# Patient Record
Sex: FEMALE | Race: BLACK OR AFRICAN AMERICAN | NOT HISPANIC OR LATINO | URBAN - METROPOLITAN AREA
[De-identification: names, ages, dates, MRNs, and addresses within clinical notes are randomized per-mention and may not be internally consistent; named-entity substitution may affect disease eponyms.]

---

## 2018-08-23 ENCOUNTER — EMERGENCY (EMERGENCY)
Facility: HOSPITAL | Age: 73
LOS: 1 days | Discharge: HOME | End: 2018-08-23
Attending: EMERGENCY MEDICINE | Admitting: EMERGENCY MEDICINE
Payer: COMMERCIAL

## 2018-08-23 VITALS
TEMPERATURE: 98 F | DIASTOLIC BLOOD PRESSURE: 63 MMHG | HEART RATE: 88 BPM | SYSTOLIC BLOOD PRESSURE: 128 MMHG | RESPIRATION RATE: 18 BRPM | OXYGEN SATURATION: 97 %

## 2018-08-23 DIAGNOSIS — M79.89 OTHER SPECIFIED SOFT TISSUE DISORDERS: ICD-10-CM

## 2018-08-23 DIAGNOSIS — M79.642 PAIN IN LEFT HAND: ICD-10-CM

## 2018-08-23 PROCEDURE — 73110 X-RAY EXAM OF WRIST: CPT | Mod: 26

## 2018-08-23 PROCEDURE — 73090 X-RAY EXAM OF FOREARM: CPT | Mod: 26

## 2018-08-23 PROCEDURE — 73130 X-RAY EXAM OF HAND: CPT | Mod: 26

## 2018-08-23 PROCEDURE — 99284 EMERGENCY DEPT VISIT MOD MDM: CPT | Mod: 25

## 2018-08-23 NOTE — ED ADULT NURSE NOTE - NSIMPLEMENTINTERV_GEN_ALL_ED
Implemented All Universal Safety Interventions:  [place] to call system. Call bell, personal items and telephone within reach. Instruct patient to call for assistance. Room bathroom lighting operational. Non-slip footwear when patient is off stretcher. Physically safe environment: no spills, clutter or unnecessary equipment. Stretcher in lowest position, wheels locked, appropriate side rails in place.

## 2018-08-23 NOTE — ED ADULT NURSE NOTE - OBJECTIVE STATEMENT
Pt aaox3, breathing RA freely, skin warm and dry; appears comfortable. Swelling to left wrist and hand x 2days, +bruising, skin warm, +/= radial pulses. Denies trauma. Limited ROM of wrist and fingers

## 2018-08-24 LAB
ALBUMIN SERPL ELPH-MCNC: 3.5 G/DL — SIGNIFICANT CHANGE UP (ref 3.3–5)
ALP SERPL-CCNC: 120 U/L — SIGNIFICANT CHANGE UP (ref 40–120)
ALT FLD-CCNC: 19 U/L — SIGNIFICANT CHANGE UP (ref 10–45)
ANION GAP SERPL CALC-SCNC: 16 MMOL/L — SIGNIFICANT CHANGE UP (ref 5–17)
AST SERPL-CCNC: 32 U/L — SIGNIFICANT CHANGE UP (ref 10–40)
BASOPHILS NFR BLD AUTO: 0.1 % — SIGNIFICANT CHANGE UP (ref 0–2)
BILIRUB SERPL-MCNC: 0.5 MG/DL — SIGNIFICANT CHANGE UP (ref 0.2–1.2)
BUN SERPL-MCNC: 28 MG/DL — HIGH (ref 7–23)
CALCIUM SERPL-MCNC: 9.7 MG/DL — SIGNIFICANT CHANGE UP (ref 8.4–10.5)
CHLORIDE SERPL-SCNC: 97 MMOL/L — SIGNIFICANT CHANGE UP (ref 96–108)
CO2 SERPL-SCNC: 20 MMOL/L — LOW (ref 22–31)
CREAT SERPL-MCNC: 2.11 MG/DL — HIGH (ref 0.5–1.3)
EOSINOPHIL NFR BLD AUTO: 1.8 % — SIGNIFICANT CHANGE UP (ref 0–6)
GLUCOSE SERPL-MCNC: 235 MG/DL — HIGH (ref 70–99)
HCT VFR BLD CALC: 27.5 % — LOW (ref 34.5–45)
HGB BLD-MCNC: 10 G/DL — LOW (ref 11.5–15.5)
LYMPHOCYTES # BLD AUTO: 7.6 % — LOW (ref 13–44)
MCHC RBC-ENTMCNC: 24.5 PG — LOW (ref 27–34)
MCHC RBC-ENTMCNC: 36.4 G/DL — HIGH (ref 32–36)
MCV RBC AUTO: 67.4 FL — LOW (ref 80–100)
MONOCYTES NFR BLD AUTO: 14.4 % — HIGH (ref 2–14)
NEUTROPHILS NFR BLD AUTO: 76.1 % — SIGNIFICANT CHANGE UP (ref 43–77)
PLATELET # BLD AUTO: 143 K/UL — LOW (ref 150–400)
POTASSIUM SERPL-MCNC: 4.2 MMOL/L — SIGNIFICANT CHANGE UP (ref 3.5–5.3)
POTASSIUM SERPL-SCNC: 4.2 MMOL/L — SIGNIFICANT CHANGE UP (ref 3.5–5.3)
PROT SERPL-MCNC: 7.4 G/DL — SIGNIFICANT CHANGE UP (ref 6–8.3)
RBC # BLD: 4.08 M/UL — SIGNIFICANT CHANGE UP (ref 3.8–5.2)
RBC # FLD: 15.6 % — SIGNIFICANT CHANGE UP (ref 10.3–16.9)
SODIUM SERPL-SCNC: 133 MMOL/L — LOW (ref 135–145)
WBC # BLD: 10.4 K/UL — SIGNIFICANT CHANGE UP (ref 3.8–10.5)
WBC # FLD AUTO: 10.4 K/UL — SIGNIFICANT CHANGE UP (ref 3.8–10.5)

## 2018-08-24 PROCEDURE — 73110 X-RAY EXAM OF WRIST: CPT

## 2018-08-24 PROCEDURE — 80053 COMPREHEN METABOLIC PANEL: CPT

## 2018-08-24 PROCEDURE — 96365 THER/PROPH/DIAG IV INF INIT: CPT

## 2018-08-24 PROCEDURE — 99284 EMERGENCY DEPT VISIT MOD MDM: CPT | Mod: 25

## 2018-08-24 PROCEDURE — 73090 X-RAY EXAM OF FOREARM: CPT | Mod: 26,LT

## 2018-08-24 PROCEDURE — 85025 COMPLETE CBC W/AUTO DIFF WBC: CPT

## 2018-08-24 PROCEDURE — 73130 X-RAY EXAM OF HAND: CPT

## 2018-08-24 PROCEDURE — 73130 X-RAY EXAM OF HAND: CPT | Mod: 26,LT

## 2018-08-24 PROCEDURE — 36415 COLL VENOUS BLD VENIPUNCTURE: CPT

## 2018-08-24 PROCEDURE — 73110 X-RAY EXAM OF WRIST: CPT | Mod: 26,LT

## 2018-08-24 PROCEDURE — 73090 X-RAY EXAM OF FOREARM: CPT

## 2018-08-24 RX ORDER — ACETAMINOPHEN 500 MG
975 TABLET ORAL ONCE
Qty: 0 | Refills: 0 | Status: COMPLETED | OUTPATIENT
Start: 2018-08-24 | End: 2018-08-24

## 2018-08-24 RX ORDER — SODIUM CHLORIDE 9 MG/ML
500 INJECTION INTRAMUSCULAR; INTRAVENOUS; SUBCUTANEOUS ONCE
Qty: 0 | Refills: 0 | Status: COMPLETED | OUTPATIENT
Start: 2018-08-24 | End: 2018-08-24

## 2018-08-24 RX ADMIN — Medication 100 MILLIGRAM(S): at 03:15

## 2018-08-24 RX ADMIN — SODIUM CHLORIDE 500 MILLILITER(S): 9 INJECTION INTRAMUSCULAR; INTRAVENOUS; SUBCUTANEOUS at 03:05

## 2018-08-24 RX ADMIN — SODIUM CHLORIDE 500 MILLILITER(S): 9 INJECTION INTRAMUSCULAR; INTRAVENOUS; SUBCUTANEOUS at 03:33

## 2018-08-24 RX ADMIN — Medication 975 MILLIGRAM(S): at 01:08

## 2018-08-24 RX ADMIN — Medication 110 MILLIGRAM(S): at 02:33

## 2018-08-24 RX ADMIN — Medication 975 MILLIGRAM(S): at 02:36

## 2018-08-24 NOTE — ED PROVIDER NOTE - MEDICAL DECISION MAKING DETAILS
swelling and erythema to dorsal aspect of L hand, denies trauma, afebrile, able to ROM but w/ pain, no IVDU, low suspicion for septic joint, xray to eval for traumatic injury, cellulitis/erysipelas? will tx w/ abx, follow up with PMD swelling and erythema to dorsal aspect of L hand, denies trauma, afebrile, able to ROM but w/ pain, no IVDU, low suspicion for septic joint, xray to eval for traumatic injury, cellulitis/erysipelas? will tx w/ abx, follow up with PMD ( Cr @ 2.11 discussed and emphasized for prompt f/u )

## 2018-08-24 NOTE — ED PROVIDER NOTE - OBJECTIVE STATEMENT
72 yof pw L hand swelling and pain.  denies trauma/fall.  pt states slipped in her kitchen and injured her toe but did not recall any injury to hand.  no fc.  no IVDU. 72 yof pw L hand swelling and pain.  denies trauma/fall.  pt states slipped in her kitchen and injured her toe but did not recall any injury to hand.  no fc.  no IVDU.  no hx of rheumatologic process.  no dm.

## 2018-08-24 NOTE — ED PROVIDER NOTE - DIAGNOSTIC INTERPRETATION
ER Physician: Nikhil Haynes  HAND XRAY INTERPRETATION:  no acute fracture; no soft tissue swelling noted; normal bony alignment.   WRIST XRAY INTERPRETATION:  no acute fracture; no soft tissue swelling noted; normal bony alignment.   FOREARM XRAY INTERPRETATION:  no acute fracture; no soft tissue swelling noted; normal bony alignment.

## 2018-08-24 NOTE — ED PROVIDER NOTE - PHYSICAL EXAMINATION
CON: ao x 3, HENMT: clear oropharynx, soft neck, HEAD: atraumatic, SKIN: erythema noted to dorsal aspect of L hand over 3/4/5th metacarpal, no vesicle, no bullae, no gangrene, no petechiae, tender, no phalanx involvement, no wang surface involvement, MSK: tender over 3/4/5 metacarpal w/o obvious deformities, wrist full ROM but w/ pain, no obvious effusion, forearm compartment soft, full ROM of elbow, cap refill < 2sec, CON: ao x 3, HENMT: clear oropharynx, soft neck, HEAD: atraumatic, SKIN: erythema noted to dorsal aspect of L hand over 3/4/5th metacarpal, no vesicle, no bullae, no gangrene, no petechiae, tender, no phalanx involvement, no wang surface involvement, small area of abrasion noted, MSK: tender over 3/4/5 metacarpal w/o obvious deformities, wrist ROM > 30 degrees w/ ulnar extension/flexion w/ pain, no obvious effusion, forearm compartment soft, full ROM of elbow, cap refill < 2sec,

## 2020-09-24 NOTE — ED PROVIDER NOTE - NS ED MD DISPO DISCHARGE CCDA
[facility] [place] ENCOUNTER   SURGERY DAILY PROGRESS NOTE    ADMISSION DATE:  9/22/2020  DATE:  9/24/2020  CURRENT HOSPITAL DAY:  Hospital Day: 3  SURGEON:  Dr. Jaquez  ATTENDING PHYSICIAN:  Valeria Jaquez MD  POSTOPERATIVE DAY:  2 Days Post-Op      SUBJECTIVE:  Patient was seen and examined this AM. Pt was lightheaded and dizzy last night, BP decreased from 138/71 to 91/56. Drain output, ecchymosis and BP decrease concerning for hematoma, therefore pt was given 1L fluid bolus, CBC checked, and was wrapped with a pressure dressing. Pt placed NPO pending possible re-operation.     This morning, pt complains of pain from ace wrap being too tight, did not sleep well. States she feels better than yesterday. Denies light headedness, dizziness, nausea, fever/chills, SOB, chest pain, abdominal pain.       OBJECTIVE:    VITAL SIGNS:    Vital Last Value 24 Hour Range   Temperature 97.7 °F (36.5 °C) Temp  Min: 97.7 °F (36.5 °C)  Max: 98.4 °F (36.9 °C)   Pulse 78 Pulse  Min: 78  Max: 89   Respiratory 18 Resp  Min: 14  Max: 20   Blood Pressure 109/58 BP  Min: 90/53  Max: 109/58   Pulse Oximetry 98 % SpO2  Min: 94 %  Max: 98 %     INTAKE/OUTPUT:      CARMEN output overnight   Drain A- 5ml  Drain B-40ml      Intake/Output Summary (Last 24 hours) at 9/24/2020 0730  Last data filed at 9/24/2020 0300  Gross per 24 hour   Intake --   Output 217 ml   Net -217 ml       Date 09/23/20 0700 - 09/24/20 0659 09/24/20 0700 - 09/25/20 0659   Shift 2948-0065 9027-0888 1010-4539 24 Hour Total 0601-6379 9772-2352 7218-6455 24 Hour Total   INTAKE   Shift Total           OUTPUT   Drains 102 75 40 217         Output (ml) (Drain 09/22/20 #1 Left Breast Bulb (e.g. CARMEN, Davol, etc)) 7 5  12         Output (ml) (Drain 09/22/20 #2 Left Breast Bulb (e.g. CARMEN, Davol, etc)) 95 70 40 205       Shift Total 102 75 40 217       Weight (kg) 69.9 69.9 69.9 69.9 69.9 69.9 69.9 69.9          PHYSICAL EXAM:    General- Alert and oriented, NAD  CV- RRR    Resp-Non-labored respirations, no use of accessory muscles  BREAST: incision of the left breast is c/d/i, both skin flaps well perfused, CARMEN drains in place with bloody sanguinous output, ecchymosis in the left axilla around drain sites that extends further superior. Soft and tender to palpation  Abdomen- Soft, non-distended   LE-  No swelling or tenderness in lower extremities bilaterally    LABORATORY DATA:    No results found    Invalid input(s): POSTASSIUM  Recent Labs   Lab 09/23/20  1650   WBC 13.8*   RBC 2.56*   HGB 7.9*   HCT 25.1*             IMAGING STUDIES:    I personally reviewed the images  Mri Breast Diagnostic Bilateral W Wo Contrast    Result Date: 8/26/2020  Narrative: #[id number] - MRI BREAST DIAGNOSTIC BILATERAL W WO CONTRAST BREAST MRI OF BOTH BREASTS- WITH CAD: 8/26/2020 COMPARISON:  No prior exams were available for comparison.  TECHNIQUE: Interpretation of this MRI was correlated with available mammograms and ultrasounds.  Gadolinium contrast calibrated to patient weight was injected.  Axial T1, T2, pre and post contrast T1, and coronal images were obtained.  Post processing was performed  including color parametric mapping, computer aided calculations of any tumor volumes and dimensions, and 2D multiplanar reconstruction.  FINDINGS: Bilateral background breast enhancement is moderate.  RIGHT: There are scattered enhancing foci seen within the right breast. No suspicious abnormal enhancement is identified in the breast. There is no axillary or internal mammary lymphadenopathy. LEFT: There are postsurgical changes seen in the left breast localized to the posterior to middle third lower outer quadrant related to the patient's recent postlumpectomy changes. This is a 5.0 cm hyperintense T2 loculated fluid collection in the region of the lumpectomy bed which may represent a postoperative seroma. There is diffuse circumferential enhancement of the lumpectomy bed and clumped nonmass  enhancement seen in the lateral portion of the lumpectomy bed extending to the lateral skin surface. This enhancement is of uncertain etiology with differential diagnosis including postoperative changes versus residual disease. There a few enhancing foci and masses seen just anterior and medial to the lumpectomy bed with the largest measuring 1.2 cm which are of nonspecific. These can be seen on axial subtracted images slice 43 and 41.  These have a similar appearance to the focal asymmetries seen the mammogram. Additional disease/ satellite lesions not entirely excluded. There are scattered enhancing foci in the left breast. No axillary abnormality.      Impression: KNOWN BIOPSY POSITIVE MALIGNANCY 1. Left breast postsurgical changes localized to the middle to posterior third lower outer quadrant. There is circumferential enhancement of the lumpectomy bed and additional clumped nonmass enhancement seen in the lateral portion of the lumpectomy bed extending to the lateral skin surface. This enhancement is of uncertain etiology with differential diagnosis including postoperative changes versus residual disease. 2. Enhancing foci and masses seen just anterior and medial to the lumpectomy bed with the largest measuring 1.2 cm which are nonspecific. These have a similar appearance to the focal asymmetries seen the mammogram in the region of known disease. Additional disease extent or satellite lesions not entirely excluded. 3. No MR evidence of malignancy in the right breast.  Continued surgical and oncology management is recommended with additional followup and imaging pending their recommendations. MRI BI-RADS: 6 Known biopsy proven malignancy   Electronically Signed by: Liya Cantu M.D.  pc/:8/26/2020 13:54:41  Imaging Technologist: Alyssa Tavares, [facility] Node Injection Only    Result Date: 9/22/2020  Narrative: EXAM: Left breast lymphoscintigraphy. Isotope: 500 uCi of technetium 99m  Lymphoseek. CLINICAL INDICATION: 69 years Female with left breast carcinoma for intraoperative sentinel node localization. The physician has requested for isotope injection only.   PROCEDURE: 500 uCi of technetium 99m Lymphoseek was intradermally administered as two aliquots to the periareolar region of the left breast in the O.R for sentinel node localization.     Impression: Intradermal administration of isotope to the left breast for intraoperative sentinel node localization.  Electronically Signed by: HOMAR GUTIERREZ M.D. Signed on: 9/22/2020 5:54 PM     Mammo Breast Specimen Left    Result Date: 9/23/2020  Narrative: #[id number] - MAMMO BREAST SPECIMEN LEFT SPECIMEN: 9/22/2020 CLINICAL HISTORY:Left specimen.  COMPARISON:  Comparison is made to exams dated:  8/26/2020 breast MRI, 8/4/2020 specimen, 7/2/2020 specimen, 7/2/2020 localization, 2/20/2020 ultrasound biopsy, and 2/20/2020 mammogram - [facility].  PROCEDURE: Two specimens provided for interpretation. One specimen contains three distal wire hooks and 3 microclips. The other specimen contains surgical clips. Pathology pending. Follow up imaging interval per ordering provider.      Impression: SPECIMEN Two specimens provided for interpretation. One specimen contains three distal wire hooks and 3 microclips. The other specimen contains surgical clips. Pathology pending. Follow up imaging interval per ordering provider.  Electronically Signed by: Vane Porter M.D.  nha/checo:9/23/2020 11:41:51  Imaging Technologist: SOFIYA Leonard(R)(M), [facility]                               ASSESSMENT:    Kell is a 69 year old female POD#2 left simple mastectomy with sentinel lymph node biopsy, now concerning for a post op hematoma due to increased drain output (500ml in 24hours), ecchymosis, and symptomatic low blood pressure.       PLAN:    -Hgb 7.9, follow up hgb from this morning, transfuse if <7  -keep NPO  -Keep  ACE wrap on  -Continue fluids @ 100m/hr  -CARMEN Drain care  -Pain control PRN with Tylenol, Norco, and morphine.   -Bowel regimen-colace BID  -DVT PPx: SCDs, hold lovenox          Plan of care discussed with Dr. Leonid Garcia  Multi-Specialty Surgical Physician Assistant  Phone #  Available via [employer]     Addendum:  I examined this patient, and she has mild eccymosis left axilla, no hematoma.  Vitals are stable, and patient has minimal pain.  Able to go home today if tolerating diet and continued minimal output of CARMEN's.    Repeat Hgb is above 8.           Patient/Caregiver provided printed discharge information.

## 2022-08-10 NOTE — ED ADULT NURSE NOTE - NS ED NURSE DISCH DISPOSITION
How Severe Is Your Skin Lesion?: moderate Have Your Skin Lesions Been Treated?: not been treated Is This A New Presentation, Or A Follow-Up?: Skin Lesion Discharged